# Patient Record
Sex: FEMALE | Employment: PART TIME | URBAN - METROPOLITAN AREA
[De-identification: names, ages, dates, MRNs, and addresses within clinical notes are randomized per-mention and may not be internally consistent; named-entity substitution may affect disease eponyms.]

---
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
## 2023-08-15 NOTE — PROGRESS NOTES
This appointment is a continuation of the neuropsychological evaluation started this morning, 8/22/23, at 8:00. Please refer to the note from that visit for full details.    Chris Sheppard, PhD, ABPP

## 2023-08-22 ENCOUNTER — OFFICE VISIT (OUTPATIENT)
Dept: NEUROPSYCHOLOGY | Facility: CLINIC | Age: 18
End: 2023-08-22
Payer: COMMERCIAL

## 2023-08-22 ENCOUNTER — OFFICE VISIT (OUTPATIENT)
Dept: NEUROPSYCHOLOGY | Facility: CLINIC | Age: 18
End: 2023-08-22
Attending: NURSE PRACTITIONER
Payer: COMMERCIAL

## 2023-08-22 DIAGNOSIS — Z03.89 NO DIAGNOSIS ON AXIS I: Primary | ICD-10-CM

## 2023-08-22 DIAGNOSIS — R41.3 MEMORY LOSS: Primary | ICD-10-CM

## 2023-08-22 DIAGNOSIS — R41.840 ATTENTION OR CONCENTRATION DEFICIT: ICD-10-CM

## 2023-08-22 DIAGNOSIS — F41.9 ANXIETY: ICD-10-CM

## 2023-08-22 DIAGNOSIS — F32.0 CURRENT MILD EPISODE OF MAJOR DEPRESSIVE DISORDER, UNSPECIFIED WHETHER RECURRENT (H): ICD-10-CM

## 2023-08-22 PROCEDURE — 96133 NRPSYC TST EVAL PHYS/QHP EA: CPT | Performed by: PSYCHOLOGIST

## 2023-08-22 PROCEDURE — 96139 PSYCL/NRPSYC TST TECH EA: CPT | Performed by: PSYCHOLOGIST

## 2023-08-22 PROCEDURE — 99207 PR NO CHARGE LOS: CPT | Performed by: PSYCHOLOGIST

## 2023-08-22 PROCEDURE — 96138 PSYCL/NRPSYC TECH 1ST: CPT | Performed by: PSYCHOLOGIST

## 2023-08-22 PROCEDURE — 90791 PSYCH DIAGNOSTIC EVALUATION: CPT | Performed by: PSYCHOLOGIST

## 2023-08-22 PROCEDURE — 96132 NRPSYC TST EVAL PHYS/QHP 1ST: CPT | Performed by: PSYCHOLOGIST

## 2023-08-22 NOTE — PROGRESS NOTES
NAME  Juli Nam'      MRN  [id number]        03       AGE  1       SEX  Female       HANDEDNESS Right       EDUCATION 14       ANDERSON  23       PROVIDER         [employer]  [place]       STATION  OP                ORIENTATION        Time  0       Personal Info.       Place        Presidents                 TOMM         Trial 1  50                WAIS-IV         Digit Span RDS 8                WAIS-IV           Raw ScS      Similarities   28 12      Vocabulary  40 12      Block Design  60 14      Matrix Reasoning 22 12      Digit Span  25 8      Arithmetic  17 12      Symbol Search 50 17      Coding  58 7               VCI:  110 ABELINO: 117      WMI: 100 PSI: 111      FSIQ: 112                 WIAT-IV           Raw SS GE     Reading Comprehension 33 86 8.77     Math Problem Solving 57 102 12.7     Word Reading 100 92 11     Numerical Operations 46 112 >12.9     Spelling  59 117 >12.9              CPT-III        Variable type Measure T-Score Guideline     Detectability d' 0 0     Error Type  Omissions 0 0       Comissions 0 0       Perseverations 0 0     Reaction Time Statistics HRT 0 0       HRT SD 0 0       Variability 0 0       HRT Block Change 0 0       HRT CASTRO Change 0 0               TRAIL MAKING TEST         Time Errors ScS T     A 15 0 15 64     B 57 2 11 44               HVLT   1       Raw T      Trial 1  7       Trial 2  12       Trial 3  11       Learning  5       Total Recall  30 53      Delayed Recall 11 53      Percent Retention 92% 46      True Positives 12       False Positives 0       Disc. Index  12 58               ALEXYS-O COMPLEX FIGURE          Raw T %ile     Time to Copy 156  >16     Copy  34  >16     Immediate Recall 27 55 69     30 Minute Recall 24.5 49 46     Recognition Total 24 67 96              NAB NAMING   1     Raw 29 /31       z -0.41        T 44                 COWAT FAS         Raw 43        ScS 11        T 47                 ANIMAL FLUENCY        Raw 24         ScS 12        T 48                 UZAIR    H     Raw 25        %ile 56                 CLOCK DRAWING        Command:  Impaired       Copy:  Normal                STROOP          Raw T       Word 87 37       Color 70 42       C/W 53 56       Intf. 14 64                [place] CARD SORTING TEST         Raw T %ile     # Categories  4  >16     Total Correct 52       Total Errors  12 49 47     Perseverative Err. 9 41 19     % Concept Resp. 49 49 47     FTMS:  1       LTL  -3.92  >16              BDI         Raw  16       Interpretation Mild                CANDELARIA         Raw  11       Interpretation Mild                BRIELLE                           CAT-A         Childhood Memories Raw T-Score %ile     Inattention (ATT) 55 73 99     Impuslivity (IMP) 57 76 99     Hyperactivity (HYP) 56 68 95     Childhood Memories Clinical Index 168 76 99     Personal (PER) 58 74 97     Academic/Occupational (A/O) 61 77 99     Social (SOC)  49 66 97     Internal (INT) 91 78 98     External (EXT) 77 70 99              Current Symptoms Raw T-Score %ile     Inattention (ATT) 50 65 94     Impuslivity (IMP) 40 51 51     Hyperactivity (HYP) 44 54 65     Current Symptoms Clinical Index 134 58 83     Personal (PER) 47 60 83     Academic/Occupational (A/O) 41 51 56     Social (SOC)  46 61 87     Internal (INT) 69 58 83     External (EXT) 65 58 83              CAT-A Clinical Index 302 40 97          Classification    Negative Impresion 22   Typical    Infrequency 0   Typical    Positive Impression 6   Typical

## 2023-08-22 NOTE — PROGRESS NOTES
The patient was seen for neuropsychological evaluation at the request of Marissa Larson NP for the purposes of diagnostic clarification and treatment planning. 374 minutes of test administration and scoring were provided by this writer. Please see Dr. Chris Sheppard's report for a full interpretation of the findings.    Nury Hurst  Psychometrist
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
## 2023-08-29 ENCOUNTER — TELEPHONE (OUTPATIENT)
Dept: NEUROPSYCHOLOGY | Facility: CLINIC | Age: 18
End: 2023-08-29
Payer: COMMERCIAL

## 2023-08-29 NOTE — TELEPHONE ENCOUNTER
Ms. Nam called on 8/29/2023 to discuss the neuropsychological consultation that she had on 8/22/2023. Her call was returned the same day (today) by Dr. Nicki Ashton, neuropsychology post doctoral fellow who also worked on the case with Dr. Sheppard as supervisor. She stated that, since the appointment, she had gained information from her parents about concerns related to academics and behaviors during childhood. She stated that her classroom behavior was described as disruptive. She expressed that she did not think symptoms of inattention and dissociation were adequately addressed during her evaluation. She stated that aspects of the clinical interview that she provided were omitted. She stated that symptoms related to psychiatric health and sleep quality were overemphasized in the report and when making diagnostic conclusions. She expressed interest in speaking with Dr. Sheppard directly about her symptoms related to ADHD, editing the report for accuracy, and revisiting the diagnostic interpretations.     I (Dr. Sheppard) called the patient after getting briefed by Dr. Ashton.  I spoke to the patient for about 30 minutes. During the telephone call, she reported that she spoke with her parents and gathered further information on her early developmental history.  She also said that information was omitted from the evaluation report and that we emphasized some issues such as her emotional issues too much. I also looked at the interview notes taken by us and the information in the interview section of the report is consistent with what was recorded at the time of the interview. I emphasized to her what I told her during the results meeting, which is the entire pattern of results, including history and test data, do not indicate much evidence for ADHD.  I emphasized to her that all ADHD symptoms, including inattention and impulsivity, were considered in the diagnostic formulation.  I reiterated that many factors affect  "behavior and attention, not just ADHD. She also said that she \"zoned out\" during testing but this was not mentioned in the report. I replied that the psychometrists are trained to provide behavioral observations during testing, but there was no behavioral evidence of attention issues during formal testing.    At one point she asked if I had ever seen a patient with ADHD and autism.  I indicated that I had.  She then asked if I would have change the evaluation process.  I replied that she received a standard neuropsychological evaluation focusing on ADHD  which I have done for many years.  The patient then said that she should have been treated differently because she may have autism so she misinterpreted the questions, although this has not been diagnosed (during our initial encounter, she was expecting an autism evaluation but I informed her that this is outside my area of expertise and suggested that she find a specialist in adult autism for an evaluation.  With the caveat, she nonetheless agreed to continue the evaluation).   After going over her new information, she said that I was \"not giving me much to work with\" concerning changing the diagnosis.  I replied that we are happy to provide the report to a provider doing a second opinion and I also offered her a chance to write an addendum with additional information. However, I also informed the patient that in reviewing the neuropsychological assessment results, I continue to see no evidence for ADHD from any of our data points (in fact her cognitive profile was intact).  I said she could send the addendum via [employer], and the patient indicated that she does not have [employer].  I suggested that she contact medical records to help her get [employer] activated as this is the most efficient way to communicate and send any written information for us to include in the report. She was reluctant to do this, but agreed that she would reach out to medical records to get this " "set up and then she intended to send us the materials for the addendum. I said we would include it at the end of the report and medical records would send the report (including her written materials in an addendum) to outside providers if she signs an NATI.     After speaking with the patient, I also consulted with my colleague, Dr. Barillas.  I described our interactions and the patient's concerns.  I described what she reported initially, and the changes she made to her self-report.  I told Dr. Barillas that I offered to include an addendum at the end of the report and also that we would be happy to release the report to a provider offering a second opinion.  Dr. Barillas agreed with my actions and indicated they were appropriate and consistent with the standard of care, and he did not have any other suggestions for steps to take, given the situation. I also contacted risk management and advised them of the situation, per  [facility] policy.      Chris Sheppard, PhD, [facility]    8/30/23 addendum:    After the patient's telephone call of yesterday, I re-reviewed all of the data for the neuropsychological evaluation.  As noted above, she called on 8/29/23 and indicated she had new information that she had behavioral difficulties when she was younger than she reported initially during the interview.  She also questioned why we emphasized some aspects, such as her emotional functioning, and reportedly \"left out\" some information.  It should be noted that in reviewing the interviewed notes from Dr. Ashton and myself, no substantial information was left out of the interview that she reported to us.  It should also be noted that during the results meeting, we provided her with information about how her history was inconsistent with ADHD, and the new information she presented yesterday was more consistent with this diagnosis.  However, it is not possible to determine the accuracy of this information.  Regardless, when reviewing all " of the other data, including her performance on the cognitive assessment tasks and behavioral observations, the data continue to indicate that a diagnosis of ADHD was unlikely.  There was no indication of attention, impulsive, or hyperactive difficulties during formal testing noted by the trained psychometrist. The psychometrist is trained to observe and record the attentional, impulsivity, and hyperactivity issues typically seen in ADHD.  There was also no evidence of these attention, impulsive, or hyperactive difficulties observed by our postdoctoral fellow or myself during the interview.  Her performance on cognitive tasks of attention, processing speed, and executive functioning were also broadly within expected limits and not suggestive of an attentional or executive functioning disorder.  Thus, there is insufficient evidence to change the diagnosis.  As noted above, I offered the patient the opportunity to write an addendum which I will include at the end of the formal report, and I also indicated that medical records could send a copy of the report to another professional who she wants to see for a second opinion.

## 2023-09-09 ENCOUNTER — TELEPHONE (OUTPATIENT)
Dept: NEUROPSYCHOLOGY | Facility: CLINIC | Age: 18
End: 2023-09-09
Payer: COMMERCIAL

## 2023-10-13 ENCOUNTER — TELEPHONE (OUTPATIENT)
Dept: NEUROPSYCHOLOGY | Facility: CLINIC | Age: 18
End: 2023-10-13
Payer: COMMERCIAL

## 2023-10-13 NOTE — TELEPHONE ENCOUNTER
Because the patient indicated that their gender identity was male, a few neuropsychological measures with gender based norms were re-normed with male norms to determine if any changes in interpretation were necessitated by this new information. Most neuropsychological measures do not have gender based norms, but the few that were utilized in this battery were examined. In each case, there was no difference in interpretation of the results between the male and female norms.Â  Thus, the overall interpretation of the neuropsychological report is not changed by this new informationÂ and the report does not need to be changed to reflect any changes in interpretation of the results. An addended version of the report was placed into EPIC with changes in the preferred pronouns.Â  However, it was carefully considered based on what the patient informed us about yesterday.    Chris Sheppard, PhD, ABPP

## 2023-10-22 ENCOUNTER — HEALTH MAINTENANCE LETTER (OUTPATIENT)
Age: 18
End: 2023-10-22

## 2024-12-15 ENCOUNTER — HEALTH MAINTENANCE LETTER (OUTPATIENT)
Age: 19
End: 2024-12-15